# Patient Record
Sex: FEMALE | Race: WHITE | NOT HISPANIC OR LATINO | URBAN - METROPOLITAN AREA
[De-identification: names, ages, dates, MRNs, and addresses within clinical notes are randomized per-mention and may not be internally consistent; named-entity substitution may affect disease eponyms.]

---

## 2020-04-11 ENCOUNTER — HOSPITAL ENCOUNTER (EMERGENCY)
Facility: HOSPITAL | Age: 55
Discharge: HOME | End: 2020-04-11
Attending: EMERGENCY MEDICINE
Payer: COMMERCIAL

## 2020-04-11 VITALS
HEART RATE: 91 BPM | TEMPERATURE: 98 F | HEIGHT: 63 IN | WEIGHT: 175 LBS | RESPIRATION RATE: 22 BRPM | DIASTOLIC BLOOD PRESSURE: 69 MMHG | BODY MASS INDEX: 31.01 KG/M2 | OXYGEN SATURATION: 100 % | SYSTOLIC BLOOD PRESSURE: 128 MMHG

## 2020-04-11 DIAGNOSIS — M25.521 RIGHT ELBOW PAIN: ICD-10-CM

## 2020-04-11 DIAGNOSIS — S53.124A CLOSED POSTERIOR DISLOCATION OF RIGHT ELBOW, INITIAL ENCOUNTER: ICD-10-CM
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
## 2020-04-11 LAB
ALBUMIN SERPL BCP-MCNC: 4.1 G/DL (ref 3.5–5.2)
ALP SERPL-CCNC: 57 U/L (ref 55–135)
ALT SERPL W/O P-5'-P-CCNC: 23 U/L (ref 10–44)
ANION GAP SERPL CALC-SCNC: 9 MMOL/L (ref 8–16)
AST SERPL-CCNC: 27 U/L (ref 10–40)
BASOPHILS # BLD AUTO: 0.03 K/UL (ref 0–0.2)
BASOPHILS NFR BLD: 0.2 % (ref 0–1.9)
BILIRUB SERPL-MCNC: 0.6 MG/DL (ref 0.1–1)
BUN SERPL-MCNC: 17 MG/DL (ref 6–20)
CALCIUM SERPL-MCNC: 9.4 MG/DL (ref 8.7–10.5)
CHLORIDE SERPL-SCNC: 101 MMOL/L (ref 95–110)
CO2 SERPL-SCNC: 24 MMOL/L (ref 23–29)
CREAT SERPL-MCNC: 0.8 MG/DL (ref 0.5–1.4)
DIFFERENTIAL METHOD: ABNORMAL
EOSINOPHIL # BLD AUTO: 0.1 K/UL (ref 0–0.5)
EOSINOPHIL NFR BLD: 0.6 % (ref 0–8)
ERYTHROCYTE [DISTWIDTH] IN BLOOD BY AUTOMATED COUNT: 12.5 % (ref 11.5–14.5)
EST. GFR  (AFRICAN AMERICAN): >60 ML/MIN/1.73 M^2
EST. GFR  (NON AFRICAN AMERICAN): >60 ML/MIN/1.73 M^2
GLUCOSE SERPL-MCNC: 106 MG/DL (ref 70–110)
HCT VFR BLD AUTO: 40.6 % (ref 37–48.5)
HGB BLD-MCNC: 12.9 G/DL (ref 12–16)
IMM GRANULOCYTES # BLD AUTO: 0.06 K/UL (ref 0–0.04)
IMM GRANULOCYTES NFR BLD AUTO: 0.4 % (ref 0–0.5)
LYMPHOCYTES # BLD AUTO: 2.7 K/UL (ref 1–4.8)
LYMPHOCYTES NFR BLD: 19.1 % (ref 18–48)
MCH RBC QN AUTO: 29.1 PG (ref 27–31)
MCHC RBC AUTO-ENTMCNC: 31.8 G/DL (ref 32–36)
MCV RBC AUTO: 92 FL (ref 82–98)
MONOCYTES # BLD AUTO: 0.9 K/UL (ref 0.3–1)
MONOCYTES NFR BLD: 6.8 % (ref 4–15)
NEUTROPHILS # BLD AUTO: 10.1 K/UL (ref 1.8–7.7)
NEUTROPHILS NFR BLD: 72.9 % (ref 38–73)
NRBC BLD-RTO: 0 /100 WBC
PLATELET # BLD AUTO: 275 K/UL (ref 150–350)
PMV BLD AUTO: 9.5 FL (ref 9.2–12.9)
POTASSIUM SERPL-SCNC: 4 MMOL/L (ref 3.5–5.1)
PROT SERPL-MCNC: 8.6 G/DL (ref 6–8.4)
RBC # BLD AUTO: 4.43 M/UL (ref 4–5.4)
SODIUM SERPL-SCNC: 134 MMOL/L (ref 136–145)
WBC # BLD AUTO: 13.87 K/UL (ref 3.9–12.7)

## 2020-04-11 PROCEDURE — 24600 TX CLSD ELBOW DISLC W/O ANES: CPT | Mod: RT

## 2020-04-11 PROCEDURE — 96375 TX/PRO/DX INJ NEW DRUG ADDON: CPT | Mod: 59

## 2020-04-11 PROCEDURE — 63600175 PHARM REV CODE 636 W HCPCS: Performed by: NURSE PRACTITIONER

## 2020-04-11 PROCEDURE — 63600175 PHARM REV CODE 636 W HCPCS: Performed by: EMERGENCY MEDICINE

## 2020-04-11 PROCEDURE — 96361 HYDRATE IV INFUSION ADD-ON: CPT | Mod: 59

## 2020-04-11 PROCEDURE — 85025 COMPLETE CBC W/AUTO DIFF WBC: CPT

## 2020-04-11 PROCEDURE — 99900028 HC CONS SEDAT EA 15 MIN (STAT)

## 2020-04-11 PROCEDURE — 25000003 PHARM REV CODE 250: Performed by: EMERGENCY MEDICINE

## 2020-04-11 PROCEDURE — 99285 EMERGENCY DEPT VISIT HI MDM: CPT | Mod: 25

## 2020-04-11 PROCEDURE — 96365 THER/PROPH/DIAG IV INF INIT: CPT | Mod: 59

## 2020-04-11 PROCEDURE — 80053 COMPREHEN METABOLIC PANEL: CPT | Mod: ER

## 2020-04-11 RX ORDER — MORPHINE SULFATE 4 MG/ML
4 INJECTION, SOLUTION INTRAMUSCULAR; INTRAVENOUS
Status: COMPLETED | OUTPATIENT
Start: 2020-04-11 | End: 2020-04-11

## 2020-04-11 RX ORDER — LOSARTAN POTASSIUM 50 MG/1
50 TABLET ORAL DAILY
COMMUNITY

## 2020-04-11 RX ORDER — HYDROCHLOROTHIAZIDE 12.5 MG/1
12.5 CAPSULE ORAL DAILY
COMMUNITY

## 2020-04-11 RX ORDER — SODIUM CHLORIDE 9 MG/ML
1000 INJECTION, SOLUTION INTRAVENOUS
Status: COMPLETED | OUTPATIENT
Start: 2020-04-11 | End: 2020-04-11

## 2020-04-11 RX ORDER — PROPOFOL 10 MG/ML
1 VIAL (ML) INTRAVENOUS
Status: COMPLETED | OUTPATIENT
Start: 2020-04-11 | End: 2020-04-11

## 2020-04-11 RX ORDER — MORPHINE SULFATE 15 MG/1
15 TABLET ORAL EVERY 4 HOURS PRN
Qty: 12 TABLET | Refills: 0 | Status: SHIPPED | OUTPATIENT
Start: 2020-04-11 | End: 2020-04-16

## 2020-04-11 RX ORDER — ACETAMINOPHEN 500 MG
1000 TABLET ORAL
Status: COMPLETED | OUTPATIENT
Start: 2020-04-11 | End: 2020-04-11

## 2020-04-11 RX ORDER — KETOROLAC TROMETHAMINE 30 MG/ML
15 INJECTION, SOLUTION INTRAMUSCULAR; INTRAVENOUS
Status: COMPLETED | OUTPATIENT
Start: 2020-04-11 | End: 2020-04-11

## 2020-04-11 RX ADMIN — PROMETHAZINE HYDROCHLORIDE 12.5 MG: 25 INJECTION INTRAMUSCULAR; INTRAVENOUS at 07:04

## 2020-04-11 RX ADMIN — SODIUM CHLORIDE 1000 ML: 0.9 INJECTION, SOLUTION INTRAVENOUS at 08:04

## 2020-04-11 RX ADMIN — KETOROLAC TROMETHAMINE 15 MG: 30 INJECTION, SOLUTION INTRAMUSCULAR at 09:04

## 2020-04-11 RX ADMIN — PROPOFOL 79 MG: 10 INJECTION, EMULSION INTRAVENOUS at 08:04

## 2020-04-11 RX ADMIN — MORPHINE SULFATE 4 MG: 4 INJECTION INTRAVENOUS at 08:04

## 2020-04-11 RX ADMIN — ACETAMINOPHEN 1000 MG: 500 TABLET ORAL at 09:04

## 2020-04-12 NOTE — ED PROVIDER NOTES
SCRIBE #1 NOTE: I, Jazmín Jose, am scribing for, and in the presence of,  Maksim Dotson MD. I have scribed the entire note.         HISTORY     Chief Complaint   Patient presents with    Arm Pain     fell onto right arm while working in yard. pt tearful in triage. pt c/o burning to whole arm, with most pain to elbow. pulse +2.     Review of patient's allergies indicates:   Allergen Reactions    Doxycycline Other (See Comments)     Dizziness        HPI     Fall   The accident occurred just prior to arrival. Fall occurred: while pulling a root in garden  She landed on dirt. The point of impact was the right elbow. The pain is present in the right elbow. The pain is at a severity of 10/10. She was ambulatory at the scene. There was no entrapment after the fall. There was no drug use involved in the accident. There was no alcohol use involved in the accident. Pertinent negatives include no back pain, no fever, no nausea and no loss of consciousness. The symptoms are aggravated by activity, use of the injured limb and pressure on the injury. She has tried nothing for the symptoms.        PCP: HAMZAH Grant     Past Medical History:  Past Medical History:   Diagnosis Date    Hypertension         Past Surgical History:  Past Surgical History:   Procedure Laterality Date    TUBAL LIGATION          Family History:  History reviewed. No pertinent family history.     Social History:  Social History     Tobacco Use    Smoking status: Never Smoker    Smokeless tobacco: Never Used   Substance and Sexual Activity    Alcohol use: Not Currently    Drug use: Never    Sexual activity: Unknown         ROS   Review of Systems   Constitutional: Negative for fever.   HENT: Negative for sore throat.    Respiratory: Negative for shortness of breath.    Cardiovascular: Negative for chest pain.   Gastrointestinal: Negative for nausea.   Genitourinary: Negative for dysuria.   Musculoskeletal: Negative for back pain.        Right  elbow pain    Skin: Negative for rash.   Neurological: Negative for loss of consciousness and weakness.   Hematological: Does not bruise/bleed easily.       PHYSICAL EXAM     Initial Vitals [20 1921]   BP Pulse Resp Temp SpO2   (!) 181/101 99 (!) 22 98.1 °F (36.7 °C) 96 %      MAP       --           Physical Exam    Constitutional: She appears well-developed and well-nourished. No distress.   HENT:   Head: Normocephalic and atraumatic.   Eyes: Conjunctivae are normal. Pupils are equal, round, and reactive to light.   Neck: Normal range of motion. Neck supple.   Cardiovascular: Normal rate, regular rhythm, normal heart sounds and intact distal pulses.   Pulmonary/Chest: Breath sounds normal.   Abdominal: Soft. Bowel sounds are normal. She exhibits no distension. There is no tenderness. There is no rebound.   Musculoskeletal: Normal range of motion. She exhibits no edema.        Right elbow: She exhibits swelling and deformity. Tenderness found.        Arms:  Neurological: She is alert and oriented to person, place, and time. She has normal strength. No sensory deficit.   Skin: Skin is warm and dry.   Psychiatric: She has a normal mood and affect.          ED COURSE   Orthopedic Injury  Date/Time: 2020 8:22 PM  Performed by: Maksim Dotson MD  Authorized by: Maksim Dotson MD     Consent Done?:  Yes  Universal Protocol:     Verbal consent obtained?: Yes      Written consent obtained?: Yes      Consent given by:  Patient    Patient identity confirmed:   and name    Time Out: Immediately prior to the procedure a time out was called    Injury:     Injury location:  Elbow    Location details:  Right elbow    Injury type:  Dislocation    Dislocation type: anterior        Pre-procedure assessment:     Neurovascular status: Neurovascularly intact      Range of motion: reduced      Patient sedated?: Yes      ASA Class:  Class 1 - Heathy patient. No medical history.    Mallampati Score:  Class 1 - Visualization  of the soft palate, fauces, uvula, and anterior/posterior pillars.  Date/Time of last solid:  4/11/2020 12:00 PM    Date/Time of last fluid:  4/11/2020 12:00 PM    Patient/Family history of anesthesia or sedation complications: Yes      Sedation type: moderate (conscious) sedation      Sedation:  Propofol    Sedation start:  4/11/2020 8:27 PM    Sedation end:  4/11/2020 8:33 PM    Vital signs: Vital signs monitored during sedation        Selections made in this section will also lock the Injury type section above.:     Manipulation performed?: Yes      Reduction method:  Traction and counter traction    Reduction method:  Traction and counter traction    Reduction method:  Traction and counter traction    Reduction method:  Traction and counter traction    Reduction method:  Traction and counter traction    Reduction method:  Traction and counter traction    Reduction successful?: Yes      Confirmation: Reduction confirmed by x-ray      Immobilization:  Splint    Splint type:  Long arm    Complications: No      Specimens: No      Implants: No    Post-procedure assessment:     Neurovascular status: Neurovascularly intact      Range of motion: improved      Patient tolerance:  Patient tolerated the procedure well with no immediate complications      ED ONGOING VITALS:  Vitals:    04/11/20 2017 04/11/20 2018 04/11/20 2021 04/11/20 2028   BP:  (!) 149/72 (!) 142/79 (!) 161/86   Pulse: 89  94 105   Resp:   (!) 24 (!) 27   Temp:       TempSrc:       SpO2:   99% 100%   Weight:       Height:        04/11/20 2029 04/11/20 2033 04/11/20 2044 04/11/20 2046   BP: 123/77 (!) 149/70 (!) 153/87 139/85   Pulse: 97 88 98 94   Resp: 16 20 20 (!) 24   Temp:       TempSrc:       SpO2: 100% 100% 99% 96%   Weight:       Height:        04/11/20 2048 04/11/20 2051 04/11/20 2056 04/11/20 2104   BP: (!) 162/83 (!) 154/77 138/72 (!) 140/78   Pulse: 85 84 82 86   Resp: 18 (!) 22 19 (!) 22   Temp:       TempSrc:       SpO2: 100% 100% 100% 100%    Weight:       Height:        04/11/20 2117 04/11/20 2118 04/11/20 2156   BP: 136/77  128/69   Pulse: 82  91   Resp: 18  (!) 22   Temp:  98.2 °F (36.8 °C) 98.3 °F (36.8 °C)   TempSrc:      SpO2: 100%  100%   Weight:      Height:            ABNORMAL LAB VALUES:  Labs Reviewed   CBC W/ AUTO DIFFERENTIAL - Abnormal; Notable for the following components:       Result Value    WBC 13.87 (*)     Mean Corpuscular Hemoglobin Conc 31.8 (*)     Gran # (ANC) 10.1 (*)     Immature Grans (Abs) 0.06 (*)     All other components within normal limits   COMPREHENSIVE METABOLIC PANEL - Abnormal; Notable for the following components:    Sodium 134 (*)     Total Protein 8.6 (*)     All other components within normal limits         ALL LAB VALUES:  Results for orders placed or performed during the hospital encounter of 04/11/20   CBC auto differential   Result Value Ref Range    WBC 13.87 (H) 3.90 - 12.70 K/uL    RBC 4.43 4.00 - 5.40 M/uL    Hemoglobin 12.9 12.0 - 16.0 g/dL    Hematocrit 40.6 37.0 - 48.5 %    Mean Corpuscular Volume 92 82 - 98 fL    Mean Corpuscular Hemoglobin 29.1 27.0 - 31.0 pg    Mean Corpuscular Hemoglobin Conc 31.8 (L) 32.0 - 36.0 g/dL    RDW 12.5 11.5 - 14.5 %    Platelets 275 150 - 350 K/uL    MPV 9.5 9.2 - 12.9 fL    Immature Granulocytes 0.4 0.0 - 0.5 %    Gran # (ANC) 10.1 (H) 1.8 - 7.7 K/uL    Immature Grans (Abs) 0.06 (H) 0.00 - 0.04 K/uL    Lymph # 2.7 1.0 - 4.8 K/uL    Mono # 0.9 0.3 - 1.0 K/uL    Eos # 0.1 0.0 - 0.5 K/uL    Baso # 0.03 0.00 - 0.20 K/uL    nRBC 0 0 /100 WBC    Gran% 72.9 38.0 - 73.0 %    Lymph% 19.1 18.0 - 48.0 %    Mono% 6.8 4.0 - 15.0 %    Eosinophil% 0.6 0.0 - 8.0 %    Basophil% 0.2 0.0 - 1.9 %    Differential Method Automated    Comprehensive metabolic panel   Result Value Ref Range    Sodium 134 (L) 136 - 145 mmol/L    Potassium 4.0 3.5 - 5.1 mmol/L    Chloride 101 95 - 110 mmol/L    CO2 24 23 - 29 mmol/L    Glucose 106 70 - 110 mg/dL    BUN, Bld 17 6 - 20 mg/dL    Creatinine 0.8 0.5  - 1.4 mg/dL    Calcium 9.4 8.7 - 10.5 mg/dL    Total Protein 8.6 (H) 6.0 - 8.4 g/dL    Albumin 4.1 3.5 - 5.2 g/dL    Total Bilirubin 0.6 0.1 - 1.0 mg/dL    Alkaline Phosphatase 57 55 - 135 U/L    AST 27 10 - 40 U/L    ALT 23 10 - 44 U/L    Anion Gap 9 8 - 16 mmol/L    eGFR if African American >60.0 >60 mL/min/1.73 m^2    eGFR if non African American >60.0 >60 mL/min/1.73 m^2       RADIOLOGY STUDIES:  Imaging Results          X-Ray Elbow 2 Views Right (Final result)  Result time 04/11/20 20:53:11   Procedure changed from X-Ray Elbow Complete Right     Final result by Pedrito Churchill MD (04/11/20 20:53:11)                 Impression:      See above.      Electronically signed by: Pedrito Churchill MD  Date:    04/11/2020  Time:    20:53             Narrative:    EXAMINATION:  XR ELBOW 1 VIEW RIGHT    CLINICAL HISTORY:  post reduction;    FINDINGS:  Comparison study same day at 19:49 hours.  Single post reduction lateral projection submitted.  Status post closed reduction right elbow with excellent anatomic alignment.  No fracture.  No effusion.                               X-Ray Forearm Right (Final result)  Result time 04/11/20 20:16:30    Final result by Isaiah Perera MD (04/11/20 20:16:30)                 Impression:      As above.      Electronically signed by: Isaiah Perera MD  Date:    04/11/2020  Time:    20:16             Narrative:    EXAMINATION:  XR FOREARM RIGHT    TECHNIQUE:  Single lateral view right forearm.    COMPARISON:  None    FINDINGS:  There is an elbow dislocation present.  The ulna and radius are dislocated posteriorly and dorsally.    Small 4 mm bony density posterior to the distal humerus is noted.  It appears smooth and could be old though small fracture fragment not excluded.    Soft tissue swelling.                                          The above vital signs and test results have been reviewed by the emergency provider.     ED Discussion:     2000: discuss case with Dr. Dotson, he accepts  patient. Consent printed and on chart. OC Arcelia aware, she states to move patient to room 5.      8:00PM: Armand Kim NP transfers care of pt to Dr. Dotson pendguerda secondary to elbow dislocation    8:37 PM: Reassessed pt at this time.  Pt states her condition has improved at this time. Discussed with pt all pertinent ED information and results. Discussed pt dx and plan of tx. Gave pt all f/u and return to the ED instructions. All questions and concerns were addressed at this time. Pt expresses understanding of information and instructions, and is comfortable with plan to discharge. Pt is stable for discharge.    I discussed with patient and/or family/caretaker that evaluation in the ED does not suggest any emergent or life threatening medical conditions requiring immediate intervention beyond what was provided in the ED, and I believe patient is safe for discharge.  Regardless, an unremarkable evaluation in the ED does not preclude the development or presence of a serious of life threatening condition. As such, patient was instructed to return immediately for any worsening or change in current symptoms.      ED Medications:  Current Discharge Medication List        Discharge Medications:  Discharge Medication List as of 4/11/2020  9:01 PM      START taking these medications    Details   morphine (MSIR) 15 MG tablet Take 1 tablet (15 mg total) by mouth every 4 (four) hours as needed for Pain., Starting Sat 4/11/2020, Until Thu 4/16/2020, Print            Follow-up Information     Willie Sandoval MD. Schedule an appointment as soon as possible for a visit in 1 week.    Specialty:  Orthopedic Surgery  Why:  For re-evaluation and further treatment  Contact information:  [address] [place] [address] DR Sima HERNANDEZ [address]  [phone]             [facility]. Go today.    Specialty:  Emergency Medicine  Why:  If symptoms worsen, For re-evaluation and further treatment, As needed  Contact  information:  [address] [facility] [address] [phone]               MEDICAL DECISION MAKING   Medical Decision Making:   Clinical Tests:   Lab Tests: Reviewed and Ordered  Radiological Study: Reviewed and Ordered            Scribe Attestation:   Scribe #1: I performed the above scribed service and the documentation accurately describes the services I performed. I attest to the accuracy of the note.    Attending Attestation:           Physician Attestation for Scribe:  Physician Attestation Statement for Scribe #1: I, Maksim Dotson MD, reviewed documentation, as scribed by Jazmín Garcia  in my presence, and it is both accurate and complete.             CLINICAL IMPRESSION       ICD-10-CM ICD-9-CM   1. Fall W19.XXXA E888.9   2. Closed posterior dislocation of right elbow, initial encounter S53.124A 832.02   3. Right elbow pain M25.521 719.42       Disposition:   Disposition: Discharged  Condition: Stable         Maksim Dotson MD  04/12/20 0057

## 2020-04-12 NOTE — ED NOTES
Patient tolerated procedure well. Lying in bed awake. No s/s of distress noted. No complaints voiced. IV patent and intact. IV fluids going as ordered.

## 2020-04-12 NOTE — ED NOTES
Patient's  has been updated throughout patients stay here at the hospital. Awaiting blood work results due to lab machine being down.